# Patient Record
Sex: MALE | Race: WHITE | Employment: UNEMPLOYED | URBAN - METROPOLITAN AREA
[De-identification: names, ages, dates, MRNs, and addresses within clinical notes are randomized per-mention and may not be internally consistent; named-entity substitution may affect disease eponyms.]

---

## 2017-01-05 ENCOUNTER — OFFICE VISIT (OUTPATIENT)
Dept: ORTHOPEDIC SURGERY | Age: 37
End: 2017-01-05

## 2017-01-05 VITALS
DIASTOLIC BLOOD PRESSURE: 100 MMHG | TEMPERATURE: 98 F | SYSTOLIC BLOOD PRESSURE: 140 MMHG | RESPIRATION RATE: 18 BRPM | HEART RATE: 85 BPM | OXYGEN SATURATION: 98 %

## 2017-01-05 DIAGNOSIS — M79.18 MYOFASCIAL PAIN: Primary | ICD-10-CM

## 2017-01-05 RX ORDER — MELOXICAM 15 MG/1
TABLET ORAL
COMMUNITY
Start: 2016-12-09

## 2017-01-05 RX ORDER — DEXTROAMPHETAMINE SACCHARATE, AMPHETAMINE ASPARTATE MONOHYDRATE, DEXTROAMPHETAMINE SULFATE AND AMPHETAMINE SULFATE 3.75; 3.75; 3.75; 3.75 MG/1; MG/1; MG/1; MG/1
CAPSULE, EXTENDED RELEASE ORAL
Refills: 0 | COMMUNITY
Start: 2016-12-09

## 2017-01-05 NOTE — PROGRESS NOTES
[address], [address]  [place], [address] [place] [address] Street  Phone: [phone]  Fax: [phone]        Zelda Lynn  : 1980  PCP: None  2017    PROGRESS NOTE      ASSESSMENT AND PLAN    Suraj Helton comes in to the office today for an L3-4 interlaminar injection and BUE EMG f/u. He found little to no relief with the injection for about a week. Pt states he felt no relief the day of the injection. The EMG shows minimal bilateral carpal tunnel. Fior Byrd has not provided relief. The theracane provided little relief. He is still very tender to palpation in the cervical and thoracolumbar regions which leads me to believe the majority of his pain is myofascial in nature. He was referred to PT for his myofascial pain. Pt will continue taking diclofenac 75 mg BID prn. Pt will f/u in 6 weeks or prn. Jimbohansa Metzgerli was seen today for back pain. Diagnoses and all orders for this visit:    Myofascial pain  -     REFERRAL TO PHYSICAL THERAPY         Follow-up Disposition:  Return in about 6 weeks (around 2017), or if symptoms worsen or fail to improve. HISTORY OF PRESENT ILLNESS  Suraj Helton is a 43 y.o. male. A&P / HPI from 2016:  Alex Robins comes in to the office today c/o cervical and lumbar pain. Pt also has numbness in his right hand on the ulnar side. He also has a radiating pain in his left leg and decreased sensation in his right leg in the L3-4 dermatomes. I believe his cervical and lumbar pain is radicular in nature, possibly C8 and L3-4. Pt also has a component of myofascial pain in both regions.      He was referred for a cervical and lumbar MRI to further evaluate his radiating pain and numbness.       Pt will f/u in 2 weeks or prn.     Updates from 16:  Pt presents for a cervical and lumbar MRI f/u.  The cervical MRI shows moderate cervical stenosis and a potential left C7 radiculopathy.     The lumbar MRI shows potential bilateral L5 radiculopathies, L>R.     His pain today is a 5/10 in the cervical region. He is experiencing numbness in his bilateral shoulders, elbows, and third to fifth digits of his hands. The lumbar pain radiates mostly into his bilateral posterior legs but there is some pain in the anterior portion as well.     He states steroids did not provide relief. Updates from 01/05/17:  Pt presents for an L3-4 interlaminar injection and BUE EMG f/u. He found little to no relief with the injection for about a week. Pt states he felt no relief the day of the injection. The EMG shows minimal bilateral carpal tunnel. Farrah Slimmer has not provided relief. The theracane provided little relief. Pt is  to palpation in the cervical and thoracolumbar regions. He also has trigger points in his lumbar region. PAST MEDICAL HISTORY   Past Medical History   Diagnosis Date    ADHD (attention deficit hyperactivity disorder)     Psychiatric disorder      depression       Past Surgical History   Procedure Laterality Date    Hx gi       colectomy to left    Hx acl reconstruction       right knee    Hx heent       deviated septum repair   . MEDICATIONS      Current Outpatient Prescriptions   Medication Sig Dispense Refill    amphetamine-dextroamphetamine XR (ADDERALL XR) 15 mg XR capsule TK ONE C PO  QAM PRN  0    meloxicam (MOBIC) 15 mg tablet       buPROPion XL (WELLBUTRIN XL) 150 mg tablet       methylphenidate ER 18 mg 24 hr tab       tapentadol (NUCYNTA) 50 mg tablet Take 50 mg by mouth two (2) times daily as needed for Pain. Max Daily Amount: 100 mg. 60 Tab 0    buPROPion SR (WELLBUTRIN SR) 150 mg SR tablet Take 150 mg by mouth two (2) times a day. 2 in am 1 in afternoon      amitriptyline (ELAVIL) 50 mg tablet Take  by mouth nightly.  diclofenac EC (VOLTAREN) 75 mg EC tablet Take 1 Tab by mouth two (2) times daily (with meals).  60 Tab 1    hydrOXYzine (ATARAX) 50 mg tablet  HYDROcodone-acetaminophen (NORCO) 5-325 mg per tablet Take 1 Tab by mouth two (2) times a day. Max Daily Amount: 2 Tabs. 30 Tab 0        ALLERGIES  No Known Allergies       SOCIAL HISTORY    Social History     Social History    Marital status: UNKNOWN     Spouse name: N/A    Number of children: N/A    Years of education: N/A     Social History Main Topics    Smoking status: Never Smoker    Smokeless tobacco: Never Used    Alcohol use 1.2 oz/week     2 Shots of liquor per week      Comment: on occassion    Drug use: No    Sexual activity: Not Asked     Other Topics Concern    None     Social History Narrative     Social History Narrative      Problem Relation Age of Onset    Hypertension Mother     Hypertension Father          REVIEW OF SYSTEMS  Review of Systems   Constitutional: Negative for chills, diaphoresis, fever, malaise/fatigue and weight loss. Respiratory: Negative for shortness of breath. Cardiovascular: Negative for chest pain and leg swelling. Gastrointestinal: Negative for constipation, nausea and vomiting. Neurological: Negative for dizziness, tingling, seizures, loss of consciousness and headaches. Psychiatric/Behavioral: The patient does not have insomnia. PHYSICAL EXAMINATION  Visit Vitals    BP (!) 140/100    Pulse 85    Temp 98 °F (36.7 °C) (Oral)    Resp 18    SpO2 98%       Pain Assessment  11/2/2016   Location of Pain Knee   Location Modifiers Right   Severity of Pain 4   Quality of Pain Dull;Aching   Duration of Pain Persistent   Frequency of Pain Several days a week   Aggravating Factors Bending   Limiting Behavior Some   Relieving Factors -   Result of Injury -           Constitutional:  Well developed, well nourished, in no acute distress. Psychiatric: Affect and mood are appropriate. Integumentary: No rashes or abrasions noted on exposed areas. SPINE/MUSCULOSKELETAL EXAM    Cervical spine:  Neck is midline. Normal muscle tone.    No focal atrophy is noted. Neck ROM limited by pain. Shoulder ROM intact. Tenderness to palpation, particularly in the right levator scapula. Negative Spurling's sign. Negative Tinel's sign. Negative Card's sign.       Sensation in the bilateral arms grossly intact to light touch.       Lumbar spine:  No rash, ecchymosis, or gross obliquity. No fasciculations. No focal atrophy is noted. No pain with hip ROM. Full range of motion. Tenderness to palpation, particularly at the L3-4 region. No tenderness to palpation at the sciatic notch. SI joints non-tender. Trochanters non tender. Positive femoral stretch test on the left side.      Decreased sensation in right L3 and L4 dermatomes compared to left. Updates from 01/05/17:  Tenderness to palpation in cervical and thoracolumbar region. Trigger points with palpation to the lumbar region. Pain with back extension > back flexion. MOTOR:      Biceps  Triceps Deltoids Wrist Ext Wrist Flex Hand Intrin   Right 5/5 5/5 5/5 5/5 5/5 5/5   Left 5/5 5/5 5/5 5/5 5/5 5/5             Hip Flex  Quads Hamstrings Ankle DF EHL Ankle PF   Right 5/5 5/5 5/5 5/5 5/5 5/5   Left 5/5 5/5 5/5 5/5 5/5 5/5     DTRs are 2+ biceps, triceps, brachioradialis, patella, and Achilles.      Negative Straight Leg raise. Squat not tested. No difficulty with tandem gait. Normal heel walk. Normal toe rise.           Ambulation without assistive device. FWB.         RADIOGRAPHS  Cervical XR images taken on 11/8/2016 personally reviewed with patient:  1) Bone spurs  2) No obvious fractures or instabilities      Lumbar XR images taken on 11/8/2016 personally reviewed with patient:  1) Diffuse bone spurs  2) No obvious fractures or instabilities     Cervical MRI images taken on 11/16/2016 personally reviewed with patient:  FINDINGS:      IMAGE QUALITY: There is no significant motion degradation of the images.      SPINAL CORD: The spinal cord signal and volume are normal.       SPINAL COLUMN: Straightened lordosis. The background bone marrow signal is  normal. There are no acute (T2 STIR positive) or chronic compression fractures. There is no T2 STIR evidence of acute ligamentous edema/injury.       Multilevel diffuse disc desiccation, overall moderate. No significant facet  arthrosis.     Atlantooccipital and atlantoaxial joints: Unremarkable.      Atlantodental joint: Minimal arthrosis.      C2-C3: Unremarkable.      C3-C4: Small amount of disc or bone extends eccentrically into the  right ventral spinal canal. No significant central stenosis. Mild bilateral  foraminal narrowing.      C4-C5: Unremarkable.      C5-C6: Moderate broad-based eccentric left central/foraminal disc  bulge mildly indents the left ventral spinal cord. Dorsal subarachnoid space  remains adequately patent. Mild right foraminal narrowing. High-grade left  foraminal stenosis could impinge upon the left C6 nerve root.      C6-C7: Moderate broad based eccentric left central/foraminal disc  bulge mildly indents the left ventral spinal cord. Dorsal subarachnoid space  remains adequately patent. Mild right foraminal narrowing. High-grade left  foraminal stenosis could impinge upon the left C7 nerve root.      C7-T1: Unremarkable.      EXTRASPINAL TISSUES: Unremarkable.       _______________  [email or web address].Bunk  IMPRESSION  IMPRESSION:      1. Eccentric left-sided disc bulges at C5-6 and C6-7.      2. High-grade foraminal stenosis about the left C6 and left C7 nerve roots.      3. Low-grade central stenosis at C3-4, C5-6, and C6-7.      4.  Straightened cervical lordosis.       Lumbar MRI images taken on 11/16/2016 personally reviewed with patient:  FINDINGS:       IMAGE QUALITY: There is no significant motion degradation of the images.      SEGMENTAL DESIGNATION: The numbering scheme used in this dictation is  based upon the assumption of 5 lumbar segments.      INTRADURAL: The conus terminates at L1 and is normal. The nerve roots  have a normal distribution in the thecal sac without evidence of arachnoiditis.      SPINAL COLUMN AND MUSCULATURE: Straightened lumbar lordosis. The  background bone marrow signal is normal. There are no acute/subacute or chronic  compression fractures. Small chronic multilevel endplate defects reflective of  Schmorl nodes. The posterior paraspinous musculature is normal.       L5-S1:       STRUCTURE: Marked epidural lipomatosis. Unremarkable  intervertebral disc. Low-grade bilateral facet arthrosis.      IMPINGEMENT: None detected.      L4-L5:       STRUCTURE: Desiccated disc with mild height loss and small  broad-based bulge. Low-grade facet arthrosis bilaterally.      IMPINGEMENT: No significant central stenosis. Moderate left and  mild right lateral recess stenosis on this supine exam, which could potentially  produce impingement upon the descending left L5 nerve root. Mild bilateral  foraminal narrowing. No evidence of extra foraminal nerve root impingement.      L3-L4:       STRUCTURE: Desiccated disc with moderate height loss and small  circumferential bulge. Unremarkable facet joints.      IMPINGEMENT: None detected.      L2-L3:       STRUCTURE: Unremarkable.      IMPINGEMENT: None detected.      L1-L2:       STRUCTURE: Unremarkable.      IMPINGEMENT: None detected.          LOWER THORACIC SPINE:      T12-L1: Unremarkable.      T11-T12: Unremarkable.          EXTRASPINAL TISSUES: Unremarkable.      SACROILIAC JOINTS: Mild arthrosis in the partially imaged upper portions.      _______________  Satyarlene Maple  IMPRESSION  IMPRESSION:      1. L3-L4 and L4-L5 disc degeneration.      2. Lateral recess stenosis but could potentially impinge upon the left greater  than right descending L5 nerve roots.      3. Low-grade lower lumbar facet arthrosis.      4.  Multilevel lower thoracic and upper lumbar endplate defects, more than is  typical for this age group.      Unless the patient has a history of sickle cell disease, these most likely  reflect routine Schmorl nodes of no significance. If the patient has a history  of painful kyphosis, consider thoracic spine radiography to assess for sequela  of Scheuermann disease (typically diagnosed in adolescence, not in the adult  population).       5. L5-S1 epidural lipomatosis. EMG/NCS taken on 12/5/2016 personally reviewed with patient:  Findings of electrodiagnostic studies performed today show normal sensory and motor nerve conductions in both upper extremities with the exception of mild prolongation of distal median sensory latency across the wrist bilaterally. Motor nerve conduction velocities and H-reflexes are within normal limits bilaterally. Needle EMG of both upper extremity muscles, proximally and distally, does not show any abnormality. Clinical Interpretation: This EMG study, as performed, shows the presence of incidental minimal carpal tunnel syndrome bilaterally. No other diagnostic abnormality is seen. Clinical correlation is suggested.           reviewed      Mr. Pat Maurer has a reminder for a \"due or due soon\" health maintenance. I have asked that he contact his primary care provider for follow-up on this health maintenance.       This plan was reviewed with the patient and patient agrees. All questions were answered. More than half of this visit today was spent on counseling.      Written by Marlene English, as dictated by Dr. Kamryn Freitas, Dr. Dylon Escobedo, confirm that all documentation is accurate.

## 2017-01-05 NOTE — PATIENT INSTRUCTIONS
What Is Myofascial Pain Syndrome? Myofascial pain syndrome is a chronic pain disorder that affects fascia (connective tissue that covers the muscles) and is characterized by muscle pain, tenderness, and spasm. The syndrome can involve a single muscle or a muscle group. Myofascial pain syndrome typically affects muscles in asymmetric areas of the body. The precise cause of the syndrome is unknown. As with most chronic pain conditions, associated symptoms may include poor sleep, fatigue, depression, and behavioral disturbances. In myofascial pain syndrome, there are focal tender points in muscles called trigger points. A trigger point is usually within a taut band of muscle that can be felt by the examiner. They can ultimately be identified by the examiner applying pressure with one to three fingers and the thumb. Patients usually exhibit a jump sign when a trigger point is pressed. The patient with a positive jump sign may wince, cry out, and withdraw from the pressure being applied by an examiner. The pain may be referred, or felt at a distance away from the area being compressed. Trigger points can occur in muscles, ligaments, fascia, and periosteum (the membrane surrounding a bone). Pain in trigger points can be made worse with activity or stress.  Currently, four types of trigger points can be distinguished:    -An active trigger point is an area of extreme tenderness usually within a taut muscle or muscle group  -A latent trigger point is an inactive area with the potential to act like a trigger point  -A secondary trigger point is a highly irritable area in a muscle or muscle group that can be activated due to a trigger point or overload in another muscle or muscle group  -A satellite trigger point is a highly irritable spot in a muscle or muscle group that becomes active because the muscle is in the region of another trigger point  Causes Of Myofascial Pain Syndrome    No one single factor can be identified as causing myofascial pain syndrome. The syndrome may develop from a muscle injury or excessive strain on a certain muscle or muscle group, ligament, or tendon. In addition, the following factors may be contributors:    Trauma to the discs between the backbones, or vertebrae  Inflammatory conditions  Lack of blood flow to heart muscle  Deconditioning from lack of exercise  General fatigue  Nutritional deficiencies  Hormonal changes  Obesity  Intense cooling of parts of the body  Use of tobacco  Repetitive motions  Overuse of a muscle  Alcohol or drug abuse  Long-term emotional stress  Treatments For Myofascial Pain Syndrome    Myofascial pain syndrome is best treated with a team of various medical professionals and various forms of therapies. The treatment team may consist of a primary care physician, a specialist in [id number] [facility], nurses, physical and occupational therapists, massage therapists, and psychologists. The therapies may be carried out through drug and non-drug means. Current practice is combining non-drug therapies with short-term drug therapies for longer lasting and maximal benefits. Various drugs can be used in the treatment of myofascial pain syndrome. Non-steroidal anti-inflammatory drugs (NSAIDs) such as aspirin (Laureano), ibuprofen (Advil), and naproxen sodium (Aleve) can be used short term to reduce acute pain and inflammation. Acetaminophen (Tylenol) and oral narcotics such as oxycodone and hydrocodone may also be used in the short term for pain relief. Tricyclic antidepressants such as trazodone (Desyrel) and amitriptyline (Elavil) can be used at bedtime to improve sleep as well as relieve pain. Muscle relaxants such as cyclobenzaprine (Flexeril) and carisoprodol (Soma) can be used to relax muscle spasm and improve sleep, as they can have a sedating effect.     Antidepressants such as fluoxetine (Prozac), sertraline (Zoloft), and duloxetine (Cymbalta) can be helpful with the chronic pain of myofascial pain syndrome. Anti-seizure medications such as gabapentin (Neurontin) and pregabalin (Lyrica) can also be helpful with the chronic pain of the syndrome. Capsaicin cream, a topical pain reliever derived from chili peppers, may also be helpful with the chronic pain of myofascial pain syndrome. Injections can also be utilized in the treatment of myofascial pain syndrome. Trigger point injections and botulinum toxin (Botox) injections are two areas of recent interest. Trigger point injections involve direct injection of a local anesthetic into the trigger point. This method is very effective when there are only a few precisely located trigger points. Botox can also be directly injected into trigger points. This method has produced inconsistent results. Various non-drug therapies can be utilized in the treatment of myofascial pain syndrome. Physical therapy is one of the best treatments for the syndrome. The stretch and spray method has also been used with some success for treatment of the syndrome. It involves spraying the muscle or muscle group containing the trigger point with a coolant, such as flourimethane, and then slowly stretching the muscle. Massage therapy is another non-drug treatment used in the treatment of the syndrome. Massage therapists exert ischemic compression, which is the application of progressively stronger pressure on a trigger point for the purpose of eliminating its tenderness. Conclusion    Myofascial pain syndrome is a chronic pain disorder that affects muscle and the fascia covering the muscle. Key to the diagnosis and treatment of the syndrome is the identification of trigger points, which are areas of extreme tenderness in bands of taut muscle. No one knows the exact cause of the disorder. The treatment of myofascial pain syndrome is best done by a multidisciplinary team utilizing various drug and non-drug therapies.  The combination of physical therapy, trigger point injections, and massage are routinely used with some success in the treatment of myofascial pain syndrome.     [email or web address]

## 2017-01-05 NOTE — MR AVS SNAPSHOT
Visit Information Date & Time Provider Department Dept. Phone Encounter #  
 1/5/2017 10:45 AM Celine Alex MD [facility] [phone] [id number] Follow-up Instructions Return in about 6 weeks (around 2/16/2017), or if symptoms worsen or fail to improve. Follow-up and Disposition History Upcoming Health Maintenance Date Due DTaP/Tdap/Td series (1 - Tdap) 6/11/1999 INFLUENZA AGE 9 TO ADULT 8/1/2016 Allergies as of 1/5/2017  Review Complete On: 1/5/2017 By: Celine Alex MD  
 No Known Allergies Current Immunizations  Never Reviewed No immunizations on file. Not reviewed this visit You Were Diagnosed With   
  
 Codes Comments Myofascial pain    -  Primary ICD-10-CM: M79.1 ICD-9-CM: 729.1 Vitals BP Pulse Temp Resp SpO2 Smoking Status (!) 140/100 85 98 °F (36.7 °C) (Oral) 18 98% Never Smoker Preferred Pharmacy Pharmacy Name Phone [facility] [address] [place] [phone] Your Updated Medication List  
  
   
This list is accurate as of: 1/5/17 11:57 AM.  Always use your most recent med list.  
  
  
  
  
 amitriptyline 50 mg tablet Commonly known as:  ELAVIL Take  by mouth nightly. amphetamine-dextroamphetamine XR 15 mg XR capsule Commonly known as:  ADDERALL XR  
TK ONE C PO  QAM PRN  
  
 diclofenac EC 75 mg EC tablet Commonly known as:  VOLTAREN Take 1 Tab by mouth two (2) times daily (with meals). HYDROcodone-acetaminophen 5-325 mg per tablet Commonly known as:  Debra Arts Take 1 Tab by mouth two (2) times a day. Max Daily Amount: 2 Tabs. hydrOXYzine HCl 50 mg tablet Commonly known as:  ATARAX  
  
 meloxicam 15 mg tablet Commonly known as:  MOBIC  
  
 methylphenidate 18 mg CR tablet Commonly known as:  CONCERTA  
  
 tapentadol 50 mg tablet Commonly known as:  Mary Edouard Take 50 mg by mouth two (2) times daily as needed for Pain. Max Daily Amount: 100 mg.  
  
 * WELLBUTRIN  mg SR tablet Generic drug:  buPROPion SR Take 150 mg by mouth two (2) times a day. 2 in am 1 in afternoon * buPROPion  mg tablet Commonly known as:  WELLBUTRIN XL  
  
 * Notice: This list has 2 medication(s) that are the same as other medications prescribed for you. Read the directions carefully, and ask your doctor or other care provider to review them with you. Follow-up Instructions Return in about 6 weeks (around 2/16/2017), or if symptoms worsen or fail to improve. Referral Information Referral ID Referred By Referred To  
  
 [id number] PHILLIP Reynolds Not Available Visits Status Start Date End Date 1 New Request 1/5/17 1/5/18 If your referral has a status of pending review or denied, additional information will be sent to support the outcome of this decision. Patient Instructions What Is Myofascial Pain Syndrome? Myofascial pain syndrome is a chronic pain disorder that affects fascia (connective tissue that covers the muscles) and is characterized by muscle pain, tenderness, and spasm. The syndrome can involve a single muscle or a muscle group. Myofascial pain syndrome typically affects muscles in asymmetric areas of the body. The precise cause of the syndrome is unknown. As with most chronic pain conditions, associated symptoms may include poor sleep, fatigue, depression, and behavioral disturbances. In myofascial pain syndrome, there are focal tender points in muscles called trigger points. A trigger point is usually within a taut band of muscle that can be felt by the examiner. They can ultimately be identified by the examiner applying pressure with one to three fingers and the thumb. Patients usually exhibit a jump sign when a trigger point is pressed.  The patient with a positive jump sign may wince, cry out, and withdraw from the pressure being applied by an examiner. The pain may be referred, or felt at a distance away from the area being compressed. Trigger points can occur in muscles, ligaments, fascia, and periosteum (the membrane surrounding a bone). Pain in trigger points can be made worse with activity or stress. Currently, four types of trigger points can be distinguished: 
 
-An active trigger point is an area of extreme tenderness usually within a taut muscle or muscle group 
-A latent trigger point is an inactive area with the potential to act like a trigger point 
-A secondary trigger point is a highly irritable area in a muscle or muscle group that can be activated due to a trigger point or overload in another muscle or muscle group 
-A satellite trigger point is a highly irritable spot in a muscle or muscle group that becomes active because the muscle is in the region of another trigger point Causes Of Myofascial Pain Syndrome No one single factor can be identified as causing myofascial pain syndrome. The syndrome may develop from a muscle injury or excessive strain on a certain muscle or muscle group, ligament, or tendon. In addition, the following factors may be contributors: 
 
Trauma to the discs between the backbones, or vertebrae Inflammatory conditions Lack of blood flow to heart muscle Deconditioning from lack of exercise General fatigue Nutritional deficiencies Hormonal changes Obesity Intense cooling of parts of the body Use of tobacco 
Repetitive motions Overuse of a muscle Alcohol or drug abuse Long-term emotional stress Treatments For Myofascial Pain Syndrome Myofascial pain syndrome is best treated with a team of various medical professionals and various forms of therapies.  The treatment team may consist of a primary care physician, a specialist in Physical Medicine and Rehabilitation, nurses, physical and occupational therapists, massage therapists, and psychologists. The therapies may be carried out through drug and non-drug means. Current practice is combining non-drug therapies with short-term drug therapies for longer lasting and maximal benefits. Various drugs can be used in the treatment of myofascial pain syndrome. Non-steroidal anti-inflammatory drugs (NSAIDs) such as aspirin (Laureano), ibuprofen (Advil), and naproxen sodium (Aleve) can be used short term to reduce acute pain and inflammation. Acetaminophen (Tylenol) and oral narcotics such as oxycodone and hydrocodone may also be used in the short term for pain relief. Tricyclic antidepressants such as trazodone (Desyrel) and amitriptyline (Elavil) can be used at bedtime to improve sleep as well as relieve pain. Muscle relaxants such as cyclobenzaprine (Flexeril) and carisoprodol (Soma) can be used to relax muscle spasm and improve sleep, as they can have a sedating effect. Antidepressants such as fluoxetine (Prozac), sertraline (Zoloft), and duloxetine (Cymbalta) can be helpful with the chronic pain of myofascial pain syndrome. Anti-seizure medications such as gabapentin (Neurontin) and pregabalin (Lyrica) can also be helpful with the chronic pain of the syndrome. Capsaicin cream, a topical pain reliever derived from chili peppers, may also be helpful with the chronic pain of myofascial pain syndrome. Injections can also be utilized in the treatment of myofascial pain syndrome. Trigger point injections and botulinum toxin (Botox) injections are two areas of recent interest. Trigger point injections involve direct injection of a local anesthetic into the trigger point. This method is very effective when there are only a few precisely located trigger points. Botox can also be directly injected into trigger points. This method has produced inconsistent results. Various non-drug therapies can be utilized in the treatment of myofascial pain syndrome. Physical therapy is one of the best treatments for the syndrome. The stretch and spray method has also been used with some success for treatment of the syndrome. It involves spraying the muscle or muscle group containing the trigger point with a coolant, such as flourimethane, and then slowly stretching the muscle. Massage therapy is another non-drug treatment used in the treatment of the syndrome. Massage therapists exert ischemic compression, which is the application of progressively stronger pressure on a trigger point for the purpose of eliminating its tenderness. Conclusion Myofascial pain syndrome is a chronic pain disorder that affects muscle and the fascia covering the muscle. Key to the diagnosis and treatment of the syndrome is the identification of trigger points, which are areas of extreme tenderness in bands of taut muscle. No one knows the exact cause of the disorder. The treatment of myofascial pain syndrome is best done by a multidisciplinary team utilizing various drug and non-drug therapies. The combination of physical therapy, trigger point injections, and massage are routinely used with some success in the treatment of myofascial pain syndrome. [email or web address]  
 
  
Introducing \[facility]\"" & HEALTH SERVICES! Alessandra Thomas introduces Trello patient portal. Now you can access parts of your medical record, email your doctor's office, and request medication refills online. 1. In your internet browser, go to [email or web address] 2. Click on the First Time User? Click Here link in the Sign In box. You will see the New Member Sign Up page. 3. Enter your Trello Access Code exactly as it appears below. You will not need to use this code after youve completed the sign-up process.  If you do not sign up before the expiration date, you must request a new code. 
 
· Arrayent Access Code: [email or web address] Expires: 1/11/2017 12:56 PM 
 
4. Enter the last four digits of your Social Security Number (xxxx) and Date of Birth (mm/dd/yyyy) as indicated and click Submit. You will be taken to the next sign-up page. 5. Create a Arrayent ID. This will be your Arrayent login ID and cannot be changed, so think of one that is secure and easy to remember. 6. Create a Arrayent password. You can change your password at any time. 7. Enter your Password Reset Question and Answer. This can be used at a later time if you forget your password. 8. Enter your e-mail address. You will receive e-mail notification when new information is available in [address] 9. Click Sign Up. You can now view and download portions of your medical record. 10. Click the Download Summary menu link to download a portable copy of your medical information. If you have questions, please visit the Frequently Asked Questions section of the Arrayent website. Remember, Arrayent is NOT to be used for urgent needs. For medical emergencies, dial 911. Now available from your iPhone and Android! Please provide this summary of care documentation to your next provider. Your primary care clinician is listed as NONE. If you have any questions after today's visit, please call [phone].

## 2017-01-11 ENCOUNTER — HOSPITAL ENCOUNTER (OUTPATIENT)
Dept: PHYSICAL THERAPY | Age: 37
Discharge: HOME | End: 2017-01-11
Payer: OTHER GOVERNMENT

## 2017-01-11 PROCEDURE — 97162 PT EVAL MOD COMPLEX 30 MIN: CPT

## 2017-01-11 PROCEDURE — 97110 THERAPEUTIC EXERCISES: CPT

## 2017-01-11 PROCEDURE — 97530 THERAPEUTIC ACTIVITIES: CPT

## 2017-01-11 NOTE — PROGRESS NOTES
Physical Therapy Evaluation Cervical Spine - [facility]    SUBJECTIVE  Pain Level (0-10 scale): 6  [x]constant []intermittent []improving []worsening [x]no change since onset    Any medication changes, allergies to medications, adverse drug reactions, diagnosis change, or new procedure performed?: [x] No    [] Yes (see summary sheet for update)  Subjective functional status/changes:     PLOF: Surfing, jogging    Limitations to PLOF: Unable to do either due to pain    Mechanism of Injury: Reports started about eigtht months ago for the back when he was helping someone move an AC condenser was dropped on one side; he reports he had back pain prior to that incident, and a history of going on light duties due to pain. He reports Dr. Lorena Babb told him he has a bulging disc in the lower back and a bulging disc and narrowing in the cervical spine. Current symptoms/Complaints: Sharp, dull - locks up in lower back; burning and increased \"weight\" feel in neck    Previous Treatment/Compliance: PT for neck and back - reports it did not help (he reports he was given AROM HEP)    PMHx/Surgical Hx: R knee surgery (meniscus, cartilage), R elbow ligament reattachment, diverticulitis, ADHD, depression    Work Hx: Unemployed currently, 17 years in the [employer] with heavy lifitng/on his feet a lot    Living Situation: one story house with 2 steps to enter; has some one around to help if needed    Pt Goals: \"Get rid of pain. \"    Barriers: []pain []financial []time []transportation []other    Motivation: fair    Substance use: []Alcohol []Tobacco []other:     FABQ Score: [x]low []elevate    Cognition: A & O x 4    Other:    OBJECTIVE/EXAMINATION  Domestic Life: no difficulties reported    Activity/Recreational Limitations: unable to perform    Mobility: no difficulties reported    Self Care: no difficulties reported      Symptoms  Aggravated by:   [x] Bending [x] Sitting [x] Standing [x] Reaching Overhead   [] Moving [x] Cough (neck) [x] Sneeze (neck) [] Eating   [x] AM  [x] PM  Lying:  [] sup   [] pro   [x] sidelying   [] Other:     Eased by:    [] Bending [] Sitting [] Standing Lying: [] sup  [] pro  [] sidelying   [] Moving [] AM  [] PM  [] Other:     General Health:  Red Flags Indicated? [] Yes    [] No  [] Yes [x] No Recent weight change (If yes, due to dieting? [] Yes  [] No)   [] Yes [x] No Persistent cough  [] Yes [x] No Unremitting pain at night  [] Yes [x] No Dizziness  [] Yes [x] No Blurred vision  [] Yes [x] No Hands/Feet more cold or painful in cold weather  [] Yes [x] No Ringing in ears - present for years  [] Yes [x] No Difficulty swallowing  [] Yes [x] No Dysfunction of bowel or bladder  [] Yes [x] No Recent illness within past 3 weeks (i.e, cold, flu)  [] Yes [x] No Jaw pain  No abdominal pain or painful/bloody urination      Headaches: Do you have headaches? [x] Yes   [] No  How often do you get headaches? 3x/wk  How long does the headache last? 1 hour to 24 hours  What aggravates it? Bright light, noise  What relieves it? Towel over eyes, rest/relax  Where is the headache?  Behind eyes  Does it change locations? no    OBJECTIVE  Posture: [] WNL  Head Position: forward, upper cervical ext  Shoulder/Scapular Position:  C-Kyphosis:  [] increased   [] decreased   C-Lordosis:   [] increased   [] decreased  T-Kyphosis:  [x] increased   [] decreased  T-Lordosis:   [] increased   [] decreased     TMJ: [] N/A [] Abnormal - ROM:   Palpation:    Cervical Retraction: [] WNL    [] Abnormal:    Shoulder/Scapular Screen: [] WNL    [] Abnormal:    Active Movements: [] N/A   [] Too acute   [] Other:  ROM Cervical Trunk Cervical MMT Trunk MMT   Forward flexion 100% 90% 4+/5 4/5   Extension 100% 100% 4+/5 4+/5   SB right  50% p! 100% 4+/5 4+/5   SB left  50% p! 100% 4+/5 4+/5   Rotation right 75% 75% 4+/5 4+/5   Rotation left 75% 75% 4+/5 4+/5     Thoracic Spine: [] N/A    [] WNL   [] Other:    PROM:    Palpation:  [] Min  [x] Mod  [] Severe Location: B SIJ (R>L)  [] Min  [] Mod  [] Severe    Location:  [] Min  [] Mod  [] Severe    Location:    Neuro Screen (myotome/dematome/felexes): [] WNL  Myotome Level Muscle Test Myotome Level Muscle Test   C5 Shoulder Adduction - Deltoid C8 Finger Flexors   C6 Wrist Extension T1 Finger Abduction - Interossei   C7 Elbow Extension     Comments:    Upper Limb Tension Tests: [x] N/A       Ulnar: [] R    [] L    [] +    [] -       Median: [] R    [] L    [] +    [] -       Radial: [] R    [] L    [] +    [] -    Special Tests:  Cervical:        Vertebral Artery:  [] R    [] L    [] +    [] -       Alar Ligament: [x] R    [x] L    [] +    [x] -       Sharp Hernandez: [x] R    [x] L    [] +    [x] -       Spurling's:  [x] R    [x] L    [] +    [x] -       Distraction:  [] R    [] L    [] +    [] -       Compression: [] R    [] L    [] +    [] -    Thoracic Outlet Tests: [x] N/A       Adson's:  [] R    [] L    [] +    [] -       Hyperabduction: [] R    [] L    [] +    [] -       Aguilar's:  [] R    [] L    [] +    [] -       Krishna:  [] R    [] L    [] +    [] -    Diaphragmatic Breathing: [] Normal    [] Abnormal    Muscle Flexibility: [] N/A   Scalenes: [] WNL    [] Tight    [] R    [] L   Upper Trap: [] WNL    [] Tight    [] R    [] L   Levator: [] WNL    [] Tight    [] R    [] L   Pect. Minor: [] WNL    [] Tight    [] R    [] L    Global Muscular Weakness: [] N/A   Lower Trap:   Rhomboids:   Middle Trap:   Serratus Ant:   Ext Rotators: Other:    LE MMT:  B ankle DF: 4+/5  B ankle PF: 4+/5  B hip flex: 4/5  B knee ext: 4+/5  B knee flex: 4+/5  B hip ext: 3/5 p!     B ASLR:1/3  HS 90-90: (+) B  EARLINE/FAIR (+) B  R ant/L post SIJ  Other tests/comments:

## 2017-01-11 NOTE — PROGRESS NOTES
[facility]  [address]  [place], [address]  [phone] fax    Plan of Care/ Statement of Necessity for Physical Therapy Services    Patient name: Anushka Menjivar Start of Care: 2017   Referral source: Marly Levin MD : 1980    Medical Diagnosis: Neck pain [M54.2]  Back pain [M54.9]  Myalgia [M79.1]   Onset Date:    Treatment Diagnosis: Cervicalgia, lumbago, SIJ instability   Prior Hospitalization: see medical history Provider#: [id number]   Medications: Verified on Patient summary List    Comorbidities: R knee surgery (meniscus, cartilage), R elbow ligament reattachment, diverticulitis, ADHD, depression   Prior Level of Function: Unemployed currently, 17 years in the [employer] with heavy lifitng/on his feet a lot     The Plan of Care and following information is based on the information from the initial evaluation. Assessment/ key information: Pt presents with S&S consistent with cervicalgia, lumbago, and SIJ instability, with limitations noted in posture, cervical and trunk AROM, trunk and B hip flex MMT, B hip ext MMT, B ASLR 1/3, HS 90-90/EARLINE/FAIR, R ant/L post SIJ alignment, and FOTO score for neck at 59 and back at 57. Pt would benefit from skilled PT intervention to address the above listed limitations and allow improved functional task completion.     Evaluation Complexity History MEDIUM  Complexity : 1-2 comorbidities / personal factors will impact the outcome/ POC ; Examination LOW Complexity : 1-2 Standardized tests and measures addressing body structure, function, activity limitation and / or participation in recreation  ;Presentation LOW Complexity : Stable, uncomplicated  ;Clinical Decision Making MEDIUM Complexity : FOTO score of 26-74  Overall Complexity Rating: MEDIUM  Problem List: pain affecting function, decrease ROM, decrease strength, impaired gait/ balance, decrease ADL/ functional abilitiies, decrease activity tolerance, decrease flexibility/ joint mobility and decrease transfer abilities   Treatment Plan may include any combination of the following: Therapeutic exercise, Therapeutic activities, Neuromuscular re-education, Physical agent/modality, Gait/balance training, Manual therapy, Patient education, Self Care training, Functional mobility training, Home safety training and Other: dry needling if deemed appropriate by trained physical therapist  Patient / Family readiness to learn indicated by: asking questions, trying to perform skills and interest  Persons(s) to be included in education: patient (P)  Barriers to Learning/Limitations: yes;  emotional  Patient Goal (s): Get rid of pain.   Patient Self Reported Health Status: fair  Rehabilitation Potential: fair    Short Term Goals: To be accomplished in two weeks:   1. Pt will demonstrate compliance with HEP for self management of symptoms. Long Term Goals: To be accomplished in four weeks:   1. Pt will demonstrate B hip ext MMT to 4/5 or greater to improve stability for position changes. 2.  Pt will demonstrate upright posture with decreased upper cervical ext to decrease HAs. 3.  Pt will demonstrate FOTO to 67 for back and 68 for neck to indicate improved functional task completion. Frequency / Duration: Patient to be seen 2 times per week for 4 weeks. Patient/ Caregiver education and instruction: Diagnosis, prognosis, self care, activity modification and exercises   [x]  Plan of care has been reviewed with KRISTIE Dunaway, PT 1/11/2017 10:20 AM    ________________________________________________________________________    I certify that the above Therapy Services are being furnished while the patient is under my care. I agree with the treatment plan and certify that this therapy is necessary.     [de-identified] Signature:____________________  Date:____________Time: _________    Please sign and return to [place] [address]  [place], [address]  [phone] fax

## 2017-01-11 NOTE — PROGRESS NOTES
PT DAILY TREATMENT NOTE 3-16    Patient Name: Mabel Soler  Date:2017  : 1980  [x]  Patient  Verified  Payor:  / Plan: [facility]  RETIREES AND DEPENDENTS / Product Type: Wilman Menjivar /    In time:940  Out time:1015  Total Treatment Time (min): 35  Visit #: 1 of 8    Treatment Area: Neck pain [M54.2]  Back pain [M54.9]  Myalgia [M79.1]    SUBJECTIVE  Pain Level (0-10 scale): 6  Any medication changes, allergies to medications, adverse drug reactions, diagnosis change, or new procedure performed?: [x] No    [] Yes (see summary sheet for update)  Subjective functional status/changes:   [] No changes reported  Reports started about eigtht months ago for the back when he was helping someone move an AC condenser was dropped on one side; he reports he had back pain prior to that incident, and a history of going on light duties due to pain. He reports Dr. Fiorella Crump told him he has a bulging disc in the lower back and a bulging disc and narrowing in the cervical spine. OBJECTIVE    19 min [x]Eval                  []Re-Eval     8 min Therapeutic Exercise:  [x] See flow sheet :   Rationale: increase ROM and increase strength to improve the patients ability to perform ADLs    8 min Therapeutic Activity:  []  See flow sheet :   Rationale: Reviewed with pt involved anatomy and pathology, treatment plan, and goals.            With   [] TE   [] TA   [] neuro   [] other: Patient Education: [x] Review HEP    [] Progressed/Changed HEP based on:   [] positioning   [] body mechanics   [] transfers   [] heat/ice application    [] other:      Other Objective/Functional Measures: see eval.     Pain Level (0-10 scale) post treatment: 6    ASSESSMENT/Changes in Function:   Pt presents with S&S consistent with cervicalgia, lumbago, and SIJ instability, with limitations noted in posture, cervical and trunk AROM, trunk and B hip flex MMT, B hip ext MMT, B ASLR 1/3, HS 90-90/EARLINE/FAIR, R ant/L post SIJ alignment, and FOTO score for neck at 59 and back at 57. Pt would benefit from skilled PT intervention to address the above listed limitations and allow improved functional task completion.       Patient will continue to benefit from skilled PT services to modify and progress therapeutic interventions, address functional mobility deficits, address ROM deficits, address strength deficits, analyze and address soft tissue restrictions, analyze and cue movement patterns, analyze and modify body mechanics/ergonomics, assess and modify postural abnormalities and instruct in home and community integration to attain remaining goals. [x]  See Plan of Care  []  See progress note/recertification  []  See Discharge Summary         Progress towards goals / Updated goals:  Short Term Goals: To be accomplished in two weeks:  1. Pt will demonstrate compliance with HEP for self management of symptoms. Long Term Goals: To be accomplished in four weeks:  1. Pt will demonstrate B hip ext MMT to 4/5 or greater to improve stability for position changes. 2. Pt will demonstrate upright posture with decreased upper cervical ext to decrease HAs. 3. Pt will demonstrate FOTO to 67 for back and 68 for neck to indicate improved functional task completion.     PLAN  [x]  Upgrade activities as tolerated     [x]  Continue plan of care  [x]  Update interventions per flow sheet       []  Discharge due to:_  []  Other:_      Babatunde Luna, PT 1/11/2017  12:53 PM    Future Appointments  Date Time Provider Hedy Shah   1/13/2017 9:30 AM Babatunde Luna, PT [id number]   2/16/2017 10:30 AM Zabrina Lr  [address]

## 2017-01-13 ENCOUNTER — HOSPITAL ENCOUNTER (OUTPATIENT)
Dept: PHYSICAL THERAPY | Age: 37
Discharge: HOME | End: 2017-01-13
Payer: OTHER GOVERNMENT

## 2017-01-13 PROCEDURE — 97110 THERAPEUTIC EXERCISES: CPT

## 2017-01-13 PROCEDURE — 97112 NEUROMUSCULAR REEDUCATION: CPT

## 2017-01-13 NOTE — PROGRESS NOTES
PT DAILY TREATMENT NOTE 3-16    Patient Name: Rodríguez Bender  Date:2017  : 1980  [x]  Patient  Verified  Payor:  / Plan: [facility]  RETIREES AND DEPENDENTS / Product Type: Alexander Segal /    In time:930  Out time:1006  Total Treatment Time (min): 36  Visit #: 2 of 8    Treatment Area: Neck pain [M54.2]  Back pain [M54.9]  Myalgia [M79.1]    SUBJECTIVE  Pain Level (0-10 scale): 5  Any medication changes, allergies to medications, adverse drug reactions, diagnosis change, or new procedure performed?: [x] No    [] Yes (see summary sheet for update)  Subjective functional status/changes:   [] No changes reported  Pt reports no real change after initial eval.    OBJECTIVE     min []Eval                  []Re-Eval     10 min Therapeutic Exercise:  [x] See flow sheet :   Rationale: increase ROM and increase strength to improve the patients ability to perform ADLs    26 min Neuromuscular Re-education:  [x]  See flow sheet :   Rationale: increase strength, improve coordination and increase proprioception  to improve the patients ability to improve stability            With   [] TE   [] TA   [] neuro   [] other: Patient Education: [x] Review HEP    [] Progressed/Changed HEP based on:   [] positioning   [] body mechanics   [] transfers   [] heat/ice application    [] other:      Other Objective/Functional Measures: demonstrates good understanding of scap stability with exercises     Pain Level (0-10 scale) post treatment: 5    ASSESSMENT/Changes in Function:   Pt reports feeling sore after therapy. He demonstrates good understanding of scap squeeze while performing s/l shld exercises. He reports he will call to schedule further appointments. Reviewed CP/MHP use at home for symptom management.     Patient will continue to benefit from skilled PT services to modify and progress therapeutic interventions, address functional mobility deficits, address ROM deficits, address strength deficits, analyze and address soft tissue restrictions, analyze and cue movement patterns, analyze and modify body mechanics/ergonomics, assess and modify postural abnormalities and instruct in home and community integration to attain remaining goals. []  See Plan of Care  []  See progress note/recertification  []  See Discharge Summary         Progress towards goals / Updated goals:  Short Term Goals: To be accomplished in two weeks:  1. Pt will demonstrate compliance with HEP for self management of symptoms. Compliance reported 01/13/2017  Long Term Goals: To be accomplished in four weeks:  1. Pt will demonstrate B hip ext MMT to 4/5 or greater to improve stability for position changes. 2. Pt will demonstrate upright posture with decreased upper cervical ext to decrease HAs. 3. Pt will demonstrate FOTO to 67 for back and 68 for neck to indicate improved functional task completion.     PLAN  [x]  Upgrade activities as tolerated     [x]  Continue plan of care  [x]  Update interventions per flow sheet       []  Discharge due to:_  []  Other:_      Arabella Small, PT 1/13/2017  9:37 AM    Future Appointments  Date Time Provider Hedy Shah   2/16/2017 10:30 AM Sruthi Umanzor  [address]

## 2017-03-24 NOTE — PROGRESS NOTES
[facility]  [address]  [place], [address]  [phone] fax    Physical Therapy Discharge Summary  Patient name: Veronica Hickey Start of Care: 2017   Referral source: Evgeny Hoang MD : 1980    Medical Diagnosis: Neck pain [M54.2]  Back pain [M54.9]  Myalgia [M79.1] Onset Date:    Treatment Diagnosis: Cervicalgia, lumbago, SIJ instability   Prior Hospitalization: see medical history Provider#: [id number]   Medications: Verified on Patient summary List   Comorbidities: R knee surgery (meniscus, cartilage), R elbow ligament reattachment, diverticulitis, ADHD, depression  Prior Level of Function: Unemployed currently, 17 years in the [employer] with heavy lifitng/on his feet a lot    Visits from Start of Care: 2    Missed Visits: 0  Reporting Period : 2017 to 2017      Summary of Care:  Short Term Goals: To be accomplished in two weeks:  1. Pt will demonstrate compliance with HEP for self management of symptoms. Compliance reported 2017  Long Term Goals: To be accomplished in four weeks:  1. Pt will demonstrate B hip ext MMT to 4/5 or greater to improve stability for position changes. 2. Pt will demonstrate upright posture with decreased upper cervical ext to decrease HAs. 3. Pt will demonstrate FOTO to 67 for back and 68 for neck to indicate improved functional task completion. ASSESSMENT/RECOMMENDATIONS: Unable to further assess progress towards goals at this time due to non-compliance/lack of attendance. DC at this time with no further instructions to the patient.   Thank you for this referral.[x]Discontinue therapy: []Patient has reached or is progressing toward set goals      [x]Patient is non-compliant or has abdicated      []Due to lack of appreciable progress towards set goals    Taylor Rock, PT 3/24/2017 1:26 PM

## 2021-09-22 ENCOUNTER — TRANSCRIBE ORDER (OUTPATIENT)
Dept: SCHEDULING | Age: 41
End: 2021-09-22

## 2021-09-22 DIAGNOSIS — M54.50 LUMBAGO: Primary | ICD-10-CM

## 2021-09-23 ENCOUNTER — HOSPITAL ENCOUNTER (OUTPATIENT)
Dept: MRI IMAGING | Age: 41
Discharge: HOME | End: 2021-09-23
Payer: OTHER GOVERNMENT

## 2021-09-23 DIAGNOSIS — M54.50 LUMBAGO: ICD-10-CM

## 2021-09-23 PROCEDURE — 72148 MRI LUMBAR SPINE W/O DYE: CPT | Performed by: INTERNAL MEDICINE
